# Patient Record
Sex: FEMALE | Race: WHITE | URBAN - METROPOLITAN AREA
[De-identification: names, ages, dates, MRNs, and addresses within clinical notes are randomized per-mention and may not be internally consistent; named-entity substitution may affect disease eponyms.]

---

## 2017-11-10 ENCOUNTER — APPOINTMENT (OUTPATIENT)
Dept: URBAN - METROPOLITAN AREA CLINIC 290 | Age: 57
Setting detail: DERMATOLOGY
End: 2017-11-10

## 2017-11-10 DIAGNOSIS — Z12.83 ENCOUNTER FOR SCREENING FOR MALIGNANT NEOPLASM OF SKIN: ICD-10-CM

## 2017-11-10 DIAGNOSIS — D18.0 HEMANGIOMA: ICD-10-CM

## 2017-11-10 DIAGNOSIS — L57.0 ACTINIC KERATOSIS: ICD-10-CM

## 2017-11-10 PROBLEM — D23.71 OTHER BENIGN NEOPLASM OF SKIN OF RIGHT LOWER LIMB, INCLUDING HIP: Status: ACTIVE | Noted: 2017-11-10

## 2017-11-10 PROBLEM — D18.01 HEMANGIOMA OF SKIN AND SUBCUTANEOUS TISSUE: Status: ACTIVE | Noted: 2017-11-10

## 2017-11-10 PROBLEM — D48.5 NEOPLASM OF UNCERTAIN BEHAVIOR OF SKIN: Status: ACTIVE | Noted: 2017-11-10

## 2017-11-10 PROBLEM — D23.72 OTHER BENIGN NEOPLASM OF SKIN OF LEFT LOWER LIMB, INCLUDING HIP: Status: ACTIVE | Noted: 2017-11-10

## 2017-11-10 PROCEDURE — OTHER LIQUID NITROGEN: OTHER

## 2017-11-10 PROCEDURE — OTHER COUNSELING: OTHER

## 2017-11-10 PROCEDURE — OTHER REASSURANCE: OTHER

## 2017-11-10 PROCEDURE — OTHER MIPS QUALITY: OTHER

## 2017-11-10 PROCEDURE — OTHER OBSERVATION: OTHER

## 2017-11-10 PROCEDURE — 17000 DESTRUCT PREMALG LESION: CPT

## 2017-11-10 PROCEDURE — 99203 OFFICE O/P NEW LOW 30 MIN: CPT | Mod: 25

## 2017-11-10 ASSESSMENT — LOCATION ZONE DERM
LOCATION ZONE: FACE
LOCATION ZONE: TRUNK

## 2017-11-10 ASSESSMENT — LOCATION SIMPLE DESCRIPTION DERM
LOCATION SIMPLE: UPPER BACK
LOCATION SIMPLE: LEFT ZYGOMA

## 2017-11-10 ASSESSMENT — LOCATION DETAILED DESCRIPTION DERM
LOCATION DETAILED: INFERIOR THORACIC SPINE
LOCATION DETAILED: LEFT CENTRAL ZYGOMA

## 2017-11-10 NOTE — PROCEDURE: LIQUID NITROGEN
Duration Of Freeze Thaw-Cycle (Seconds): 0
Consent: Patient warned of the risk of swelling, blistering, and scarring. Importance of follow up discussed\\nVerbal consent obtained
Post-Care Instructions: Importance of follow up discussed
Render Post-Care Instructions In Note?: yes
Detail Level: Zone

## 2018-05-09 ENCOUNTER — APPOINTMENT (OUTPATIENT)
Dept: URBAN - METROPOLITAN AREA CLINIC 290 | Age: 58
Setting detail: DERMATOLOGY
End: 2018-05-09

## 2018-05-09 DIAGNOSIS — L57.0 ACTINIC KERATOSIS: ICD-10-CM

## 2018-05-09 PROBLEM — D23.5 OTHER BENIGN NEOPLASM OF SKIN OF TRUNK: Status: ACTIVE | Noted: 2018-05-09

## 2018-05-09 PROCEDURE — OTHER REASSURANCE: OTHER

## 2018-05-09 PROCEDURE — 99213 OFFICE O/P EST LOW 20 MIN: CPT

## 2018-05-09 PROCEDURE — OTHER COUNSELING: OTHER

## 2018-12-05 ENCOUNTER — APPOINTMENT (OUTPATIENT)
Dept: URBAN - METROPOLITAN AREA CLINIC 290 | Age: 58
Setting detail: DERMATOLOGY
End: 2018-12-18

## 2018-12-05 DIAGNOSIS — L85.3 XEROSIS CUTIS: ICD-10-CM

## 2018-12-05 DIAGNOSIS — Z12.83 ENCOUNTER FOR SCREENING FOR MALIGNANT NEOPLASM OF SKIN: ICD-10-CM

## 2018-12-05 DIAGNOSIS — L82.1 OTHER SEBORRHEIC KERATOSIS: ICD-10-CM

## 2018-12-05 DIAGNOSIS — Z41.9 ENCOUNTER FOR PROCEDURE FOR PURPOSES OTHER THAN REMEDYING HEALTH STATE, UNSPECIFIED: ICD-10-CM

## 2018-12-05 DIAGNOSIS — D22 MELANOCYTIC NEVI: ICD-10-CM

## 2018-12-05 PROBLEM — D23.4 OTHER BENIGN NEOPLASM OF SKIN OF SCALP AND NECK: Status: ACTIVE | Noted: 2018-12-05

## 2018-12-05 PROBLEM — D22.5 MELANOCYTIC NEVI OF TRUNK: Status: ACTIVE | Noted: 2018-12-05

## 2018-12-05 PROBLEM — D22.61 MELANOCYTIC NEVI OF RIGHT UPPER LIMB, INCLUDING SHOULDER: Status: ACTIVE | Noted: 2018-12-05

## 2018-12-05 PROBLEM — D22.62 MELANOCYTIC NEVI OF LEFT UPPER LIMB, INCLUDING SHOULDER: Status: ACTIVE | Noted: 2018-12-05

## 2018-12-05 PROCEDURE — 99214 OFFICE O/P EST MOD 30 MIN: CPT

## 2018-12-05 PROCEDURE — OTHER COUNSELING: OTHER

## 2018-12-05 PROCEDURE — OTHER OBSERVATION: OTHER

## 2018-12-05 PROCEDURE — OTHER MIPS QUALITY: OTHER

## 2018-12-05 PROCEDURE — OTHER PRESCRIPTION: OTHER

## 2018-12-05 PROCEDURE — OTHER TREATMENT REGIMEN: OTHER

## 2018-12-05 RX ORDER — EFLORNITHINE HYDROCHLORIDE 139 MG/G
CREAM TOPICAL BID
Qty: 1 | Refills: 3 | Status: ERX | COMMUNITY
Start: 2018-12-05

## 2018-12-05 RX ORDER — EMOLLIENT COMBINATION NO.32
EMULSION, EXTENDED RELEASE TOPICAL
Qty: 1 | Refills: 1 | Status: ERX | COMMUNITY
Start: 2018-12-05

## 2018-12-05 ASSESSMENT — LOCATION DETAILED DESCRIPTION DERM
LOCATION DETAILED: LEFT INFERIOR MEDIAL MALAR CHEEK
LOCATION DETAILED: RIGHT CHIN
LOCATION DETAILED: PERIUMBILICAL SKIN
LOCATION DETAILED: INFERIOR THORACIC SPINE
LOCATION DETAILED: LEFT ANTERIOR DISTAL UPPER ARM
LOCATION DETAILED: RIGHT ANTERIOR DISTAL UPPER ARM
LOCATION DETAILED: LEFT DISTAL POSTERIOR UPPER ARM
LOCATION DETAILED: RIGHT DISTAL POSTERIOR UPPER ARM
LOCATION DETAILED: EPIGASTRIC SKIN

## 2018-12-05 ASSESSMENT — LOCATION ZONE DERM
LOCATION ZONE: FACE
LOCATION ZONE: TRUNK
LOCATION ZONE: ARM

## 2018-12-05 ASSESSMENT — LOCATION SIMPLE DESCRIPTION DERM
LOCATION SIMPLE: LEFT POSTERIOR UPPER ARM
LOCATION SIMPLE: LEFT UPPER ARM
LOCATION SIMPLE: ABDOMEN
LOCATION SIMPLE: UPPER BACK
LOCATION SIMPLE: RIGHT UPPER ARM
LOCATION SIMPLE: RIGHT POSTERIOR UPPER ARM
LOCATION SIMPLE: CHIN
LOCATION SIMPLE: LEFT CHEEK

## 2018-12-05 NOTE — PROCEDURE: TREATMENT REGIMEN
Plan: Use a mild unscented face wash and moisturizer-cetaphil or cerave to help decrease dryness and irritation. Recommended fragrance free shaving cream and using a Mach razor to shave with.
Detail Level: Zone

## 2019-04-10 ENCOUNTER — APPOINTMENT (OUTPATIENT)
Dept: URBAN - METROPOLITAN AREA CLINIC 290 | Age: 59
Setting detail: DERMATOLOGY
End: 2019-04-10

## 2019-04-10 DIAGNOSIS — L85.3 XEROSIS CUTIS: ICD-10-CM

## 2019-04-10 DIAGNOSIS — L57.0 ACTINIC KERATOSIS: ICD-10-CM

## 2019-04-10 DIAGNOSIS — L71.8 OTHER ROSACEA: ICD-10-CM

## 2019-04-10 PROBLEM — D23.4 OTHER BENIGN NEOPLASM OF SKIN OF SCALP AND NECK: Status: ACTIVE | Noted: 2019-04-10

## 2019-04-10 PROCEDURE — OTHER TREATMENT REGIMEN: OTHER

## 2019-04-10 PROCEDURE — OTHER IN-HOUSE DISPENSING PHARMACY: OTHER

## 2019-04-10 PROCEDURE — 99213 OFFICE O/P EST LOW 20 MIN: CPT

## 2019-04-10 PROCEDURE — OTHER OBSERVATION: OTHER

## 2019-04-10 PROCEDURE — OTHER COUNSELING: OTHER

## 2019-04-10 ASSESSMENT — LOCATION DETAILED DESCRIPTION DERM
LOCATION DETAILED: LEFT CENTRAL MALAR CHEEK
LOCATION DETAILED: LEFT INFERIOR MEDIAL MALAR CHEEK

## 2019-04-10 ASSESSMENT — LOCATION ZONE DERM: LOCATION ZONE: FACE

## 2019-04-10 ASSESSMENT — LOCATION SIMPLE DESCRIPTION DERM: LOCATION SIMPLE: LEFT CHEEK

## 2019-04-10 NOTE — PROCEDURE: IN-HOUSE DISPENSING PHARMACY
Product 33 Amount/Unit (Numbers Only): 0
Product 22 Unit Type: mg
Product 1 Amount/Unit (Numbers Only): 60
Product 3 Amount/Unit (Numbers Only): 120
Product 2 Application Directions: Apply to area once daily to face
Product 6 Unit Type: ml
Name Of Product 1: Ciclopirox 3% DMSO 5% itraconazole 5% urea 20%
Send Charges To Patient Encounter: Yes
Product 3 Price/Unit (In Dollars): 40
Product 2 Refills: 1
Product 2 Amount/Unit (Numbers Only): 30
Product 6 Application Directions: Apply pea size amount to face up to twice daily
Product 4 Application Directions: Apply to area as directed
Detail Level: Zone
Name Of Product 4: Clobetasol .05% Niacinomide 4% solution
Render Product Pricing In Note: No
Name Of Product 5: Tacrolimus 0.1% Niacinomide 4% ointment
Product 6 Amount/Unit (Numbers Only): 30
Name Of Product 6: Metronidazole 1% Ivermectin 1% Niacinomide 4% potassium azelaoyl diglyconate 5% silicone gel
Product 3 Application Directions: Use to wash face daily
Name Of Product 2: Sodium sulfacetamide 10% sulfur 2% Niacinomide 4% cream
Name Of Product 3: Sodium sulfacetamide 8% Sulfur 2.75% Sal Acid 2% wash

## 2019-09-09 ENCOUNTER — APPOINTMENT (OUTPATIENT)
Dept: URBAN - METROPOLITAN AREA CLINIC 290 | Age: 59
Setting detail: DERMATOLOGY
End: 2019-09-17

## 2019-09-09 DIAGNOSIS — L71.8 OTHER ROSACEA: ICD-10-CM

## 2019-09-09 PROCEDURE — OTHER IN-HOUSE DISPENSING PHARMACY: OTHER

## 2019-09-09 NOTE — PROCEDURE: IN-HOUSE DISPENSING PHARMACY
Product 22 Refills: 0
Product 70 Unit Type: mg
Product 3 Amount/Unit (Numbers Only): 30
Product 2 Application Directions: Use as directed
Detail Level: Zone
Name Of Product 2: Sodium Sulfacetamide 8% / Sulfur 2.75% / Sal Acid 2% Wash
Product 5 Price/Unit (In Dollars): 45
Product 2 Amount/Unit (Numbers Only): 120
Name Of Product 11: Valium
Product 1 Unit Type: ml
Name Of Product 3: Ciclopirox 3% / Itraconazole 5% / Urea 20% / DMSO 5% Cream
Product 6 Application Directions: Apply one pump to face up to two times daily
Render Product Pricing In Note: No
Product 11 Amount/Unit (Numbers Only): 5
Name Of Product 5: Tacrolimus 0.1% / Niacinamide 4% Ointment
Name Of Product 1: Sodium Sulfacetamide 10% / Sulfur 2% / Niacinamide 4% Cream
Product 6 Units Dispensed: 1
Render Refills If Set To 0: Yes
Product 12 Amount/Unit (Numbers Only): .25
Name Of Product 6: Rosacea gel
Product 4 Amount/Unit (Numbers Only): 60
Name Of Product 12: Xanax
Product 6 Price/Unit (In Dollars): 40.00
Name Of Product 4: Clobetasol Propionate 0.05% / Niacinamide 4% Solution

## 2019-12-06 ENCOUNTER — APPOINTMENT (OUTPATIENT)
Dept: URBAN - METROPOLITAN AREA CLINIC 290 | Age: 59
Setting detail: DERMATOLOGY
End: 2019-12-07

## 2019-12-06 DIAGNOSIS — Z12.83 ENCOUNTER FOR SCREENING FOR MALIGNANT NEOPLASM OF SKIN: ICD-10-CM

## 2019-12-06 DIAGNOSIS — L71.8 OTHER ROSACEA: ICD-10-CM

## 2019-12-06 DIAGNOSIS — D22 MELANOCYTIC NEVI: ICD-10-CM

## 2019-12-06 DIAGNOSIS — L82.1 OTHER SEBORRHEIC KERATOSIS: ICD-10-CM

## 2019-12-06 DIAGNOSIS — D18.0 HEMANGIOMA: ICD-10-CM

## 2019-12-06 DIAGNOSIS — L70.8 OTHER ACNE: ICD-10-CM

## 2019-12-06 DIAGNOSIS — L81.4 OTHER MELANIN HYPERPIGMENTATION: ICD-10-CM

## 2019-12-06 PROBLEM — D18.01 HEMANGIOMA OF SKIN AND SUBCUTANEOUS TISSUE: Status: ACTIVE | Noted: 2019-12-06

## 2019-12-06 PROBLEM — D22.5 MELANOCYTIC NEVI OF TRUNK: Status: ACTIVE | Noted: 2019-12-06

## 2019-12-06 PROBLEM — D23.4 OTHER BENIGN NEOPLASM OF SKIN OF SCALP AND NECK: Status: ACTIVE | Noted: 2019-12-06

## 2019-12-06 PROCEDURE — OTHER OBSERVATION: OTHER

## 2019-12-06 PROCEDURE — OTHER COUNSELING: OTHER

## 2019-12-06 PROCEDURE — OTHER MIPS QUALITY: OTHER

## 2019-12-06 PROCEDURE — 99214 OFFICE O/P EST MOD 30 MIN: CPT

## 2019-12-06 ASSESSMENT — LOCATION SIMPLE DESCRIPTION DERM
LOCATION SIMPLE: RIGHT UPPER BACK
LOCATION SIMPLE: LEFT CHEEK
LOCATION SIMPLE: LEFT UPPER BACK

## 2019-12-06 ASSESSMENT — LOCATION DETAILED DESCRIPTION DERM
LOCATION DETAILED: LEFT SUPERIOR MEDIAL UPPER BACK
LOCATION DETAILED: RIGHT SUPERIOR MEDIAL UPPER BACK
LOCATION DETAILED: LEFT MID-UPPER BACK
LOCATION DETAILED: LEFT MEDIAL MALAR CHEEK
LOCATION DETAILED: LEFT CENTRAL MALAR CHEEK
LOCATION DETAILED: LEFT SUPERIOR UPPER BACK

## 2019-12-06 ASSESSMENT — LOCATION ZONE DERM
LOCATION ZONE: TRUNK
LOCATION ZONE: FACE

## 2020-06-24 ENCOUNTER — APPOINTMENT (OUTPATIENT)
Dept: URBAN - METROPOLITAN AREA CLINIC 231 | Age: 60
Setting detail: DERMATOLOGY
End: 2020-06-24

## 2020-06-24 DIAGNOSIS — L82.1 OTHER SEBORRHEIC KERATOSIS: ICD-10-CM

## 2020-06-24 DIAGNOSIS — Z12.83 ENCOUNTER FOR SCREENING FOR MALIGNANT NEOPLASM OF SKIN: ICD-10-CM

## 2020-06-24 DIAGNOSIS — D18.0 HEMANGIOMA: ICD-10-CM

## 2020-06-24 DIAGNOSIS — D22 MELANOCYTIC NEVI: ICD-10-CM

## 2020-06-24 DIAGNOSIS — L71.8 OTHER ROSACEA: ICD-10-CM

## 2020-06-24 DIAGNOSIS — L81.4 OTHER MELANIN HYPERPIGMENTATION: ICD-10-CM

## 2020-06-24 PROBLEM — D18.01 HEMANGIOMA OF SKIN AND SUBCUTANEOUS TISSUE: Status: ACTIVE | Noted: 2020-06-24

## 2020-06-24 PROBLEM — D22.5 MELANOCYTIC NEVI OF TRUNK: Status: ACTIVE | Noted: 2020-06-24

## 2020-06-24 PROBLEM — D23.4 OTHER BENIGN NEOPLASM OF SKIN OF SCALP AND NECK: Status: ACTIVE | Noted: 2020-06-24

## 2020-06-24 PROCEDURE — OTHER COUNSELING: OTHER

## 2020-06-24 PROCEDURE — OTHER TREATMENT REGIMEN: OTHER

## 2020-06-24 PROCEDURE — 99214 OFFICE O/P EST MOD 30 MIN: CPT

## 2020-06-24 ASSESSMENT — LOCATION SIMPLE DESCRIPTION DERM
LOCATION SIMPLE: LEFT UPPER BACK
LOCATION SIMPLE: LEFT CHEEK
LOCATION SIMPLE: UPPER BACK
LOCATION SIMPLE: LOWER BACK

## 2020-06-24 ASSESSMENT — LOCATION DETAILED DESCRIPTION DERM
LOCATION DETAILED: LEFT INFERIOR CENTRAL MALAR CHEEK
LOCATION DETAILED: SUPERIOR LUMBAR SPINE
LOCATION DETAILED: LEFT MEDIAL UPPER BACK
LOCATION DETAILED: SUPERIOR THORACIC SPINE
LOCATION DETAILED: INFERIOR THORACIC SPINE

## 2020-06-24 ASSESSMENT — LOCATION ZONE DERM
LOCATION ZONE: TRUNK
LOCATION ZONE: FACE

## 2024-07-18 NOTE — PROCEDURE: REASSURANCE
Detail Level: Simple PCP SIGNATURE NEEDED FOR [facility] FORM RECEIVED VIA FAX AND PLACED IN PCP FOLDER TO BE DELIVERED AT ASSIGNED TIMES.    Encounter: [id number]  Start: 7/16/24  End: 9/10/24